# Patient Record
Sex: MALE | Race: OTHER | Employment: UNEMPLOYED | URBAN - METROPOLITAN AREA
[De-identification: names, ages, dates, MRNs, and addresses within clinical notes are randomized per-mention and may not be internally consistent; named-entity substitution may affect disease eponyms.]

---

## 2017-12-14 ENCOUNTER — HOSPITAL ENCOUNTER (EMERGENCY)
Age: 37
Discharge: HOME | End: 2017-12-14
Attending: EMERGENCY MEDICINE
Payer: MEDICAID

## 2017-12-14 ENCOUNTER — APPOINTMENT (OUTPATIENT)
Dept: GENERAL RADIOLOGY | Age: 37
End: 2017-12-14
Attending: NURSE PRACTITIONER
Payer: MEDICAID

## 2017-12-14 VITALS
RESPIRATION RATE: 17 BRPM | DIASTOLIC BLOOD PRESSURE: 90 MMHG | SYSTOLIC BLOOD PRESSURE: 155 MMHG | TEMPERATURE: 98.6 F | WEIGHT: 315 LBS | HEART RATE: 95 BPM | OXYGEN SATURATION: 99 % | BODY MASS INDEX: 47.74 KG/M2 | HEIGHT: 68 IN

## 2017-12-14 DIAGNOSIS — R06.2 WHEEZING: ICD-10-CM

## 2017-12-14 DIAGNOSIS — J10.1 INFLUENZA A: Primary | ICD-10-CM

## 2017-12-14 LAB
FLUAV AG NPH QL IA: POSITIVE
FLUBV AG NPH QL IA: NEGATIVE
GLUCOSE BLD STRIP.AUTO-MCNC: 102 MG/DL (ref 65–100)

## 2017-12-14 PROCEDURE — 87804 INFLUENZA ASSAY W/OPTIC: CPT | Performed by: EMERGENCY MEDICINE

## 2017-12-14 PROCEDURE — 71020 XR CHEST PA LAT: CPT

## 2017-12-14 PROCEDURE — 99283 EMERGENCY DEPT VISIT LOW MDM: CPT | Performed by: NURSE PRACTITIONER

## 2017-12-14 PROCEDURE — 74011000250 HC RX REV CODE- 250: Performed by: NURSE PRACTITIONER

## 2017-12-14 PROCEDURE — 94640 AIRWAY INHALATION TREATMENT: CPT

## 2017-12-14 PROCEDURE — 82962 GLUCOSE BLOOD TEST: CPT

## 2017-12-14 RX ORDER — IPRATROPIUM BROMIDE AND ALBUTEROL SULFATE 2.5; .5 MG/3ML; MG/3ML
3 SOLUTION RESPIRATORY (INHALATION)
Status: COMPLETED | OUTPATIENT
Start: 2017-12-14 | End: 2017-12-14

## 2017-12-14 RX ADMIN — IPRATROPIUM BROMIDE AND ALBUTEROL SULFATE 3 ML: .5; 3 SOLUTION RESPIRATORY (INHALATION) at 18:35

## 2017-12-14 NOTE — DISCHARGE INSTRUCTIONS

## 2017-12-14 NOTE — ED PROVIDER NOTES
HPI Comments: 29 y/o m to ed with complaint of flu like illness onset yesterday. He is here with his wife who is influ A pos. He admits cough, congestion, cough of white sputum, wheezing, fever, chills, headache, ear pain, poor appetite. No vomiting or diarrhea. Used his inhalers yesterday. Did not check his blood sugar today. hsx asthma and dm, abdi    Patient is a 30 y.o. male presenting with general illness. The history is provided by the patient. No  was used. Generalized Body Aches   This is a new problem. The current episode started yesterday. The problem has been gradually worsening. Associated symptoms include headaches and shortness of breath. Pertinent negatives include no chest pain and no abdominal pain. Past Medical History:   Diagnosis Date    Asthma     Diabetes ([address]     Ill-defined condition     Sleep disorder     sleep apnea       Past Surgical History:   Procedure Laterality Date    HX APPENDECTOMY           History reviewed. No pertinent family history. Social History     Social History    Marital status:      Spouse name: N/A    Number of children: N/A    Years of education: N/A     Occupational History    Not on file. Social History Main Topics    Smoking status: Never Smoker    Smokeless tobacco: Never Used    Alcohol use No    Drug use: No    Sexual activity: Not on file     Other Topics Concern    Not on file     Social History Narrative    No narrative on file         ALLERGIES: Review of patient's allergies indicates no known allergies. Review of Systems   Constitutional: Positive for appetite change, chills, fatigue and fever. HENT: Positive for ear pain and sore throat. Negative for nosebleeds. Eyes: Negative for discharge and redness. Respiratory: Positive for cough, shortness of breath and wheezing. Cardiovascular: Negative for chest pain and palpitations.    Gastrointestinal: Negative for abdominal pain, nausea and vomiting. Endocrine: Negative for cold intolerance and heat intolerance. Genitourinary: Negative for difficulty urinating and dysuria. Musculoskeletal: Positive for myalgias. Negative for back pain and neck stiffness. Skin: Negative for color change and wound. Neurological: Positive for headaches. Negative for dizziness, facial asymmetry and weakness. Psychiatric/Behavioral: Negative for confusion and decreased concentration. Vitals:    12/14/17 1716 12/14/17 1845   BP: (!) 153/103    Pulse: 96    Resp: 16    Temp: 98.7 °F (37.1 °C)    SpO2: 98% 99%   Weight: 142.9 kg (315 lb)    Height: 5' 8\" (1.727 m)             Physical Exam   Constitutional: He is oriented to person, place, and time. He appears well-developed and well-nourished. No distress. HENT:   Head: Normocephalic and atraumatic. Right Ear: Hearing, tympanic membrane, external ear and ear canal normal.   Left Ear: Hearing, tympanic membrane, external ear and ear canal normal.   Nose: Nose normal. Right sinus exhibits no maxillary sinus tenderness and no frontal sinus tenderness. Left sinus exhibits no maxillary sinus tenderness and no frontal sinus tenderness. Mouth/Throat: Uvula is midline and mucous membranes are normal. No trismus in the jaw. No uvula swelling. Posterior oropharyngeal erythema present. No oropharyngeal exudate, posterior oropharyngeal edema or tonsillar abscesses. Eyes: Conjunctivae and EOM are normal. Pupils are equal, round, and reactive to light. Neck: Normal range of motion. Neck supple. Cardiovascular: Normal rate, regular rhythm and normal heart sounds. Pulmonary/Chest: Effort normal. No respiratory distress. He has wheezes. Abdominal: Soft. Bowel sounds are normal. He exhibits no distension. There is no tenderness. Musculoskeletal: Normal range of motion. He exhibits no edema or tenderness. Neurological: He is alert and oriented to person, place, and time. No cranial nerve deficit. Coordination normal.   Skin: Skin is warm and dry. No rash noted. Psychiatric: He has a normal mood and affect. His behavior is normal. Judgment and thought content normal.   Nursing note and vitals reviewed. MDM  Number of Diagnoses or Management Options  Influenza A: new and requires workup  Wheezing: new and requires workup  Diagnosis management comments: 31 y/o m to ed with complaint of flu like illness onset yesterday. He is here with his wife who is influ A pos. He admits cough, congestion, cough of white sputum, wheezing, fever, chills, headache, ear pain, poor appetite. No vomiting or diarrhea. Used his inhalers yesterday. Did not check his blood sugar today. hsx asthma and dm, abdi    Will eval poc glucose (104), cxr, and duoneb in ed. Waiting rapid flu (his wife is pos for influ A - I am sure he is as well.)    6:54 PM  CXR neg for acute finding. 7:00 PM  hhn complete. Improved breath sounds, although some wheezing still present. He has q gasper and pro air at home but has not been using. Will use on arrival home.   Will dc w family       Amount and/or Complexity of Data Reviewed  Clinical lab tests: ordered  Tests in the radiology section of CPT®: ordered    Risk of Complications, Morbidity, and/or Mortality  Presenting problems: minimal  Diagnostic procedures: low  Management options: minimal    Patient Progress  Patient progress: stable    ED Course       Procedures

## 2017-12-14 NOTE — LETTER
[facility] EMERGENCY DEPT 
[facility] [address] [place] [id number] 
[phone] Work/School Note Date: 12/14/2017 To Whom It May concern: 
 
Adama Kirk was seen and treated today in the emergency room for Influenza A by the following provider(s): 
Attending Provider: Jasper Martinez MD 
Nurse Practitioner: Digna Corona NP. Adama Kirk may return to work on next Wednesday. Sincerely, Digna Corona NP

## 2017-12-15 NOTE — ED NOTES
I have reviewed discharge instructions with the patient. The patient verbalized understanding. Patient left ED via Discharge Method: ambulatory to Home with wife    Opportunity for questions and clarification provided. Patient given 0 scripts. To continue your aftercare when you leave the hospital, you may receive an automated call from our care team to check in on how you are doing. This is a free service and part of our promise to provide the best care and service to meet your aftercare needs.  If you have questions, or wish to unsubscribe from this service please call [phone]. Thank you for Choosing our [facility] Emergency Department.